# Patient Record
Sex: MALE | Race: BLACK OR AFRICAN AMERICAN | Employment: FULL TIME | URBAN - METROPOLITAN AREA
[De-identification: names, ages, dates, MRNs, and addresses within clinical notes are randomized per-mention and may not be internally consistent; named-entity substitution may affect disease eponyms.]

---

## 2019-05-08 ENCOUNTER — HOSPITAL ENCOUNTER (OUTPATIENT)
Dept: PHYSICAL THERAPY | Age: 19
Discharge: HOME | End: 2019-05-08
Payer: COMMERCIAL

## 2019-05-08 PROCEDURE — 97161 PT EVAL LOW COMPLEX 20 MIN: CPT

## 2019-05-08 PROCEDURE — 97110 THERAPEUTIC EXERCISES: CPT
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
## 2019-05-08 NOTE — PROGRESS NOTES
PHYSICAL THERAPY - DAILY TREATMENT NOTE Patient Name: Wero Dothan        Date: 2019 : 2000   yes Patient  Verified Visit #:      12  Insurance: Payor: /   
 
In time: 600 Out time: 0 Total Treatment Time: 40 Medicare/[facility] Time Tracking (below) Total Timed Codes (min):  na 1:1 Treatment Time:  na  
TREATMENT AREA =  Neck pain [M54.2] SUBJECTIVE Pain Level (on 0 to 10 scale):   10 Medication Changes/New allergies or changes in medical history, any new surgeries or procedures?    no  If yes, update Summary List  
Subjective Functional Status/Changes:  []  No changes reported SEE POC OBJECTIVE 30 min Evaluation 10 min Therapeutic Exercise:  [x]  See flow sheet Rationale:      increase ROM and increase strength to improve the patients ability to perform functional ADL's Billed With/As: 
 [] TE 
 [] TA 
 [] Neuro 
 [] Self Care Patient Education: [x] Review HEP [] Progressed/Changed HEP based on:  
[] positioning   [] body mechanics   [] transfers   [] heat/ice application   
[] other:   
Other Objective/Functional Measures: SEE POC Post Treatment Pain Level (on 0 to 10) scale:   4  / 10 ASSESSMENT Assessment/Changes in Function:  
 
Evaluation Code Complexity: History LOW Complexity : Zero comorbidities / personal factors that will impact the outcome / POC; Examination HIGH Complexity : 4+ Standardized tests and measures addressing body structure, function, activity limitation and / or participation in recreation ; Presentation MEDIUM Complexity : Evolving with changing characteristics ; Decision Making MEDIUM Complexity : FOTO score of 26-74; Complexity LOW Justification for Eval Code Complexity: 
Patient History : None Listed Examination SEE ABOVE EXAM 
Clinical Presentation: evolving pain Clinical Decision Making : MACM 32 
 
  
[]  See Progress Note/Recertification Patient will continue to benefit from skilled PT services to modify and progress therapeutic interventions, address functional mobility deficits, address ROM deficits, address strength deficits, analyze and address soft tissue restrictions, analyze and cue movement patterns, analyze and modify body mechanics/ergonomics and assess and modify postural abnormalities to attain remaining goals. Progress toward goals / Updated goals: SEE POC PLAN 
[]  Upgrade activities as tolerated yes Continue plan of care  
[]  Discharge due to :   
[x]  Other: Pt will be seen 2 times per week for 12 sessions. Therapist: Luis Daniel Juan PT, DPT Date: 5/8/2019 Time: 3:09 PM  
 
Future Appointments Date Time Provider Dixon Cummings 5/8/2019  6:00 PM Yg Patel, PT [facility]

## 2019-05-13 ENCOUNTER — HOSPITAL ENCOUNTER (OUTPATIENT)
Dept: PHYSICAL THERAPY | Age: 19
Discharge: HOME | End: 2019-05-13
Payer: COMMERCIAL

## 2019-05-13 PROCEDURE — 97110 THERAPEUTIC EXERCISES: CPT

## 2019-05-13 NOTE — PROGRESS NOTES
PHYSICAL THERAPY - DAILY TREATMENT NOTE Patient Name: Chasidy Salas        Date: 2019 : 2000   yes Patient  Verified Visit #:   2   of   12  Insurance: Payor: Anahi Chappell / Plan: [employer]  CAPITATED PT / Product Type: Commerical / In time: 5:48 PM Out time: 6:29 PM  
Total Treatment Time: 39 Medicare/[facility] Time Tracking (below) Total Timed Codes (min):  N/A 1:1 Treatment Time:  N/A  
TREATMENT AREA =  Neck pain [M54.2] SUBJECTIVE Pain Level (on 0 to 10 scale):  3-4   10 Medication Changes/New allergies or changes in medical history, any new surgeries or procedures?    no  If yes, update Summary List  
Subjective Functional Status/Changes:  []  No changes reported Pt reports looking down or turning his head around increases his pain. OBJECTIVE 41 min Therapeutic Exercise:  [x]  See flow sheet Rationale:      increase ROM and increase strength to improve the patients ability to to complete work duties. held min Manual Therapy: STM/DTM to c/s paraspinals, UT/lev scap, rhomboids Rationale:      decrease pain, increase ROM and increase tissue extensibility to improve patient's ability to complete ADLs. Billed With/As: 
 [] TE 
 [] TA 
 [] Neuro 
 [] Self Care Patient Education: [x] Review HEP [] Progressed/Changed HEP based on:  
[] positioning   [] body mechanics   [] transfers   [] heat/ice application   
[] other:   
Other Objective/Functional Measures: 
 
Presented to PT session 18 minutes past appt. Pt agreed to modified PT session. Advised pt to come on time for full benefits of physical therapy. Pt responded with understanding. Initiated therex per POC (see flowsheet); req'd cues 100% of therex for proper technique and gentle ms activation. Post Treatment Pain Level (on 0 to 10) scale:   3-4   10 ASSESSMENT Assessment/Changes in Function: Able to maintain pain level throughout therex without exacerbation of symptoms. Overall fair tolerance with initiation of therex. []  See Progress Note/Recertification Patient will continue to benefit from skilled PT services to modify and progress therapeutic interventions, address functional mobility deficits, address ROM deficits, address strength deficits, analyze and address soft tissue restrictions, analyze and cue movement patterns, analyze and modify body mechanics/ergonomics and assess and modify postural abnormalities to attain remaining goals. Progress toward goals / Updated goals: No significant progress towards PT goals today due to 1st F/U appt PLAN 
[]  Upgrade activities as tolerated yes Continue plan of care  
[]  Discharge due to :   
[]  Other:   
 
Therapist: SUPRIYA Yoder Date: 5/13/2019 Time: 6:34 PM  
 
Future Appointments Date Time Provider Dixon Cummings 5/13/2019  5:30 PM Jose M Ba [facility]  
5/15/2019  5:30 PM Jose M Ba [facility]  
5/20/2019  5:30 PM Jose M Ba [facility]  
5/22/2019  5:30 PM Jose M Ba [facility]  
5/29/2019  5:30 PM Jose M Ba [facility]  
6/3/2019  5:30 PM Socorro [facility]  
6/5/2019  5:30 PM Terrence Hampton [facility]  
6/11/2019  5:30 PM Henriette House [facility]

## 2019-05-15 ENCOUNTER — HOSPITAL ENCOUNTER (OUTPATIENT)
Dept: PHYSICAL THERAPY | Age: 19
End: 2019-05-15
Payer: COMMERCIAL

## 2019-05-20 ENCOUNTER — HOSPITAL ENCOUNTER (OUTPATIENT)
Dept: PHYSICAL THERAPY | Age: 19
Discharge: HOME | End: 2019-05-20
Payer: COMMERCIAL

## 2019-05-20 PROCEDURE — 97110 THERAPEUTIC EXERCISES: CPT

## 2019-05-20 PROCEDURE — 97140 MANUAL THERAPY 1/> REGIONS: CPT

## 2019-05-20 NOTE — PROGRESS NOTES
PHYSICAL THERAPY - DAILY TREATMENT NOTE Patient Name: Morris Dolan        Date: 2019 : 2000   yes Patient  Verified Visit #:   3   of   12  Insurance: Payor: Nava Comment / Plan: [employer]  CAPITATED PT / Product Type: Commerical / In time: 5:30 PM Out time: 6:17 PM  
Total Treatment Time: 52 Medicare/[facility] Time Tracking (below) Total Timed Codes (min):  N/A 1:1 Treatment Time:  N/A  
TREATMENT AREA =  Neck pain [M54.2] SUBJECTIVE Pain Level (on 0 to 10 scale):  4 / 10 Medication Changes/New allergies or changes in medical history, any new surgeries or procedures?    no  If yes, update Summary List  
Subjective Functional Status/Changes:  []  No changes reported Pt reports having everywhere in his back, but has more pain in his midback area due to work. OBJECTIVE 32 min Therapeutic Exercise:  [x]  See flow sheet Rationale:      increase ROM and increase strength to improve the patients ability to to complete work duties. 15 min Manual Therapy: STM/DTM to c/s paraspinals, UT/lev scap in supine and rhomboids in prone; c/s PROM in all directions Rationale:      decrease pain, increase ROM and increase tissue extensibility to improve patient's ability to complete ADLs. Billed With/As: 
 [] TE 
 [] TA 
 [] Neuro 
 [] Self Care Patient Education: [x] Review HEP [] Progressed/Changed HEP based on:  
[] positioning   [] body mechanics   [] transfers   [] heat/ice application   
[] other:   
Other Objective/Functional Measures: 
 
Minimal to moderate hypertonicity noted in ms belly of UT. Decrease c/s R rot and LSB during MT. Added S/L T/S rot with FR to improve spinal mobility. Increase repetitions with TB rows to improve scapular stability for improved posture. Post Treatment Pain Level (on 0 to 10) scale:  3  / 10 ASSESSMENT Assessment/Changes in Function:  
 
Continues to demo poor seated slouched posture throughout tx session. Req'd cues for upright posture when sitting on table, able to maintain fair posture when sitting in chair with back. Noted minimal decrease in subjective pain following tx session indicating fair response to current PT interventions. Will continue to progress therex per pt's tolerance and POC . []  See Progress Note/Recertification Patient will continue to benefit from skilled PT services to modify and progress therapeutic interventions, address functional mobility deficits, address ROM deficits, address strength deficits, analyze and address soft tissue restrictions, analyze and cue movement patterns, analyze and modify body mechanics/ergonomics and assess and modify postural abnormalities to attain remaining goals. Progress toward goals / Updated goals: 
Steady progress towards pain reduction goal (STG #2) PLAN 
[]  Upgrade activities as tolerated yes Continue plan of care  
[]  Discharge due to :   
[]  Other:   
 
Therapist: SUPRIYA [place] Date: 5/20/2019 Time: 6:57 PM  
 
Future Appointments Date Time Provider Dixon Cummings 5/20/2019  5:30 PM Therese Baspan [facility]  
5/22/2019  5:30 PM Therese Ba [facility]  
5/29/2019  5:30 PM Therese Baspan [facility]  
6/3/2019  5:30 PM Flavio German [facility]  
6/5/2019  5:30 PM Baljinder Conte [facility]  
6/11/2019  5:30 PM Carl Noel [facility]

## 2019-05-22 ENCOUNTER — HOSPITAL ENCOUNTER (OUTPATIENT)
Dept: PHYSICAL THERAPY | Age: 19
Discharge: HOME | End: 2019-05-22
Payer: COMMERCIAL

## 2019-05-22 PROCEDURE — 97110 THERAPEUTIC EXERCISES: CPT

## 2019-05-22 PROCEDURE — 97140 MANUAL THERAPY 1/> REGIONS: CPT

## 2019-05-22 NOTE — PROGRESS NOTES
PHYSICAL THERAPY - DAILY TREATMENT NOTE    Patient Name: Ziyad Zeng        Date: 2019  : 2000   yes Patient  Verified  Visit #:     Insurance: Payor: Manisha Dick / [facility] / Product Type: Commerical /      In time: 5:33 PM Out time: 6:26 PM   Total Treatment Time: 53     Medicare/[facility] Time Tracking (below)   Total Timed Codes (min):  N/A 1:1 Treatment Time:  N/A     TREATMENT AREA =  Neck pain [M54.2]    SUBJECTIVE  Pain Level (on 0 to 10 scale): 4 / 10   Medication Changes/New allergies or changes in medical history, any new surgeries or procedures?    no  If yes, update Summary List   Subjective Functional Status/Changes:  []  No changes reported     Pt reports having more R sided neck pain today. OBJECTIVE    40 min Therapeutic Exercise:  [x]  See flow sheet   Rationale:      increase ROM and increase strength to improve the patients ability to to complete work duties. 13 min Manual Therapy: STM/DTM to (B) c/s paraspinals, UT/lev scap in supine   Rationale:      decrease pain, increase ROM and increase tissue extensibility to improve patient's ability to complete ADLs. Billed With/As:   [] TE   [] TA   [] Neuro   [] Self Care Patient Education: [x] Review HEP    [] Progressed/Changed HEP based on:   [] positioning   [] body mechanics   [] transfers   [] heat/ice application    [] other:      Other Objective/Functional Measures: Moderate hypertonicity noted in R>L c/s paraspinals and ms belly UT. TTP R>L mms today     Post Treatment Pain Level (on 0 to 10) scale:  3  / 10     ASSESSMENT  Assessment/Changes in Function:     Able to perform all therex without signs of distress or red flags. Continues to require cues for upright posture. Educated pt on importance of improved posture for decrease symptoms. Pt responded with understanding.      []  See Progress Note/Recertification   Patient will continue to benefit from skilled PT services to modify and progress therapeutic interventions, address functional mobility deficits, address ROM deficits, address strength deficits, analyze and address soft tissue restrictions, analyze and cue movement patterns, analyze and modify body mechanics/ergonomics and assess and modify postural abnormalities to attain remaining goals.    Progress toward goals / Updated goals:    Steady progress towards pain reduction goal (STG #2)     PLAN  []  Upgrade activities as tolerated yes Continue plan of care   []  Discharge due to :    []  Other:      Therapist: SUPRIYA Patterson    Date: 5/22/2019 Time: 7:15 PM     Future Appointments   Date Time Provider Dixon Cummings   5/22/2019  5:30 PM [facility]   5/29/2019  5:30 PM Flavio Ba [facility]   6/3/2019  5:30 PM Susanne Zafar [facility]   6/5/2019  5:30 PM Braulio Sims [facility]   6/11/2019  5:30 PM Grey Duty

## 2019-05-29 ENCOUNTER — HOSPITAL ENCOUNTER (OUTPATIENT)
Dept: PHYSICAL THERAPY | Age: 19
Discharge: HOME | End: 2019-05-29
Payer: COMMERCIAL

## 2019-05-29 PROCEDURE — 97110 THERAPEUTIC EXERCISES: CPT

## 2019-05-29 PROCEDURE — 97140 MANUAL THERAPY 1/> REGIONS: CPT

## 2019-05-29 NOTE — PROGRESS NOTES
PHYSICAL THERAPY - DAILY TREATMENT NOTE    Patient Name: Francheska Smith        Date: 2019  : 2000   yes Patient  Verified  Visit #:     Insurance: Payor: Beni Lose / Plan: [address] PT / Product Type: Commerical /      In time: 5:32 PM Out time: 6:23 PM   Total Treatment Time: 51     Medicare/[facility] Time Tracking (below)   Total Timed Codes (min):  N/A 1:1 Treatment Time:  N/A     TREATMENT AREA =  Neck pain [M54.2]    SUBJECTIVE  Pain Level (on 0 to 10 scale): 2-3  10   Medication Changes/New allergies or changes in medical history, any new surgeries or procedures?    no  If yes, update Summary List   Subjective Functional Status/Changes:  []  No changes reported     Pt currently reports feeling better today, but has no idea why. States that he quit his job last Friday and could contribute to his reduce pain due to no heavy lifting. 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 Also reports he has been noncompliant with HEP and attempting to improve posture at home. OBJECTIVE    41 min Therapeutic Exercise:  [x]  See flow sheet   Rationale:      increase ROM and increase strength to improve the patients ability to to complete work duties. 10 min Manual Therapy: STM/DTM to (B) c/s paraspinals, UT/lev scap in supine   Rationale:      decrease pain, increase ROM and increase tissue extensibility to improve patient's ability to complete ADLs.      Billed With/As:   [x] TE   [] TA   [] Neuro   [] Self Care Patient Education: [x] Review HEP    [] Progressed/Changed HEP based on:   [] positioning   [] body mechanics   [] transfers   [] heat/ice application    [] other:      Other Objective/Functional Measures:    Noted minimal to no muscle restrictions during manual therapy today  Added TB horizontal abd and increase repetitions/resistance with TB extension to increase scapular strength for improved posture     Post Treatment Pain Level (on 0 to 10) scale:  1  / 10 ASSESSMENT  Assessment/Changes in Function:     Educated pt on importance of compliance with HEP to maintain gains made from PT and manage symptoms. Pt responded with understanding. Able to reduce subjective pain and noted improved c/s extension following tx session indicating good response and tolerance with current PT interventions. []  See Progress Note/Recertification   Patient will continue to benefit from skilled PT services to modify and progress therapeutic interventions, address functional mobility deficits, address ROM deficits, address strength deficits, analyze and address soft tissue restrictions, analyze and cue movement patterns, analyze and modify body mechanics/ergonomics and assess and modify postural abnormalities to attain remaining goals. Progress toward goals / Updated goals: · Short Term Goals: To be accomplished in  6  treatments:  1. Pt to demonstrate independence with HEP to improve c/s AROM and posture for ADLs. Not met 05/29; noncompliant   2. Pt to report max pain <3/10 in order to improve sleeping tolerance.  Progressing 05/29; reports max pain 3/10 today          PLAN  [x]  Upgrade activities as tolerated yes Continue plan of care   []  Discharge due to :    []  Other:      Therapist: SUPRIYA Oswald    Date: 5/29/2019 Time: 7:31 PM     Future Appointments   Date Time Provider Dixon Cummings   5/29/2019  5:30 PM Richard Miller [facility]   6/3/2019  5:30 PM Savi Ba [facility]   6/5/2019  5:30 PM Mindy Starr [facility]   6/11/2019  5:30 PM Josepha Homans

## 2019-06-03 ENCOUNTER — HOSPITAL ENCOUNTER (OUTPATIENT)
Dept: PHYSICAL THERAPY | Age: 19
Discharge: HOME | End: 2019-06-03
Payer: COMMERCIAL

## 2019-06-03 PROCEDURE — 97110 THERAPEUTIC EXERCISES: CPT

## 2019-06-03 NOTE — PROGRESS NOTES
PHYSICAL THERAPY - DAILY TREATMENT NOTE    Patient Name: Brooklyn Eric        Date: 6/3/2019  : 2000   yes Patient  Verified  Visit #:     Insurance: Payor: Andreia Betts / Plan: [address] [place] [address] [facility] / Product Type: Commerical /       In time: 5:30 PM Out time: 6:15 PM   Total Treatment Time: 45     Medicare/[facility] Time Tracking (below)   Total Timed Codes (min):  N/A 1:1 Treatment Time:  N/A     TREATMENT AREA =  Neck pain [M54.2]    SUBJECTIVE  Pain Level (on 0 to 10 scale): 0 / 10   Medication Changes/New allergies or changes in medical history, any new surgeries or procedures?    no  If yes, update Summary List   Subjective Functional Status/Changes:  []  No changes reported     Pt currently reports no pain in the neck. States that he has been going back to the gym and playing basketball, but \"does not go as hard as before\". OBJECTIVE    45 min Therapeutic Exercise:  [x]  See flow sheet   Rationale:      increase ROM and increase strength to improve the patients ability to to complete work duties. held min Manual Therapy: STM/DTM to (B) c/s paraspinals, UT/lev scap in supine   Rationale:      decrease pain, increase ROM and increase tissue extensibility to improve patient's ability to complete ADLs. Billed With/As:   [x] TE   [] TA   [] Neuro   [] Self Care Patient Education: [x] Review HEP    [x] Progressed/Changed HEP based on:   [] positioning   [x] body mechanics   [] transfers   [] heat/ice application    [x] other: added scapular strengthening exercises to maintain good posture     Other Objective/Functional Measures:    Held on performing manual therapy today due to subjective statement - focused on active interventions to maintain gains made from PT.  Demonstrates WNL c/s AROM in all directions    FOTO: 68   GROC:  +7     Post Treatment Pain Level (on 0 to 10) scale:  0 / 10     ASSESSMENT  Assessment/Changes in Function:     Able to maintain 0/10 pain level throughout pre and post tx session. []  See Progress Note/Recertification   Patient will continue to benefit from skilled PT services to modify and progress therapeutic interventions, address functional mobility deficits, address ROM deficits, address strength deficits, analyze and address soft tissue restrictions, analyze and cue movement patterns, analyze and modify body mechanics/ergonomics and assess and modify postural abnormalities to attain remaining goals. Progress toward goals / Updated goals:    · Long Term Goals: To be accomplished in  12  treatments:  1. Pt to report +5 or > on GROC to improve functional mobility for ADLs. MET 06/03; +7  2. Improve FOTO outcome score to 70/100 in order to indicate a significant improvement in ADL function. Progressing 06/03; 68/100  3. Pt will decrease C/S AROM restriction to WNL in all directions in order to improve function with ADL's met 06/03; able to demonstrate WNL c/s AROM without pain  4. Patient will be independent with long term HEP in order to prepare for DC to home.          PLAN  [x]  Upgrade activities as tolerated yes Continue plan of care   []  Discharge due to :    []  Other:      Therapist: SUPRIYA Angeles    Date: 6/3/2019 Time: 7:05 PM     Future Appointments   Date Time Provider Dixon Cummings   6/3/2019  5:30 PM Flavio [facility]   6/5/2019  5:30 PM Baljinder ARANDA [facility]   6/11/2019  5:30 PM Amelie Baca

## 2019-06-05 ENCOUNTER — HOSPITAL ENCOUNTER (OUTPATIENT)
Dept: PHYSICAL THERAPY | Age: 19
Discharge: HOME | End: 2019-06-05
Payer: COMMERCIAL

## 2019-06-05 PROCEDURE — 97110 THERAPEUTIC EXERCISES: CPT

## 2019-06-05 NOTE — PROGRESS NOTES
PHYSICAL THERAPY - DAILY TREATMENT NOTE    Patient Name: Roman Reynolds        Date: 2019  : 2000   yes Patient  Verified  Visit #:     Insurance: Payor: Shaka Santana / Plan: [address] PT / Product Type: Commerical /      In time: 530 Out time: 605   Total Treatment Time: 35     Medicare/[employer] Time Tracking (below)   Total Timed Codes (min):  35 1:1 Treatment Time:       TREATMENT AREA =  Neck pain [M54.2]    SUBJECTIVE  Pain Level (on 0 to 10 scale):  0  / 10   Medication Changes/New allergies or changes in medical history, any new surgeries or procedures?    no  If yes, update Summary List   Subjective Functional Status/Changes:  []  No changes reported     Pt reporting no pain and no specific causes of pain at the moment. Has not been back to work yet, so not sure how that will go, and he is in the middle of a job change. OBJECTIVE  35 min Therapeutic Exercise:  [x]  See flow sheet   Rationale:      increase ROM, increase strength and improve coordination to improve the patients ability to perform pain free ADLs. Billed With/As:   [x] TE   [] TA   [] Neuro   [] Self Care Patient Education: [x] Review HEP    [] Progressed/Changed HEP based on:   [] positioning   [] body mechanics   [] transfers   [] heat/ice application    [] other:      Other Objective/Functional Measures: Therex per flow sheet. Increased resistance with multiple exercises to improve strength and stability for ADLs. Post Treatment Pain Level (on 0 to 10) scale:   0   / 10     ASSESSMENT  Assessment/Changes in Function:     Demonstrates compliance with HEP. Pt advised to return to full activities to determine if capable of all ADL's without pain. Plan to discontinue PT following last remaining visit.       []  See Progress Note/Recertification   Patient will continue to benefit from skilled PT services to modify and progress therapeutic interventions, address functional mobility deficits, address ROM deficits, address strength deficits, analyze and address soft tissue restrictions, analyze and cue movement patterns, analyze and modify body mechanics/ergonomics and assess and modify postural abnormalities to attain remaining goals. Progress toward goals / Updated goals:    Good progress toward LTG #4.        PLAN  [x]  Upgrade activities as tolerated yes Continue plan of care   []  Discharge due to :    []  Other:      Therapist: Nazia Marshall PTA    Date: 6/5/2019 Time: 5:37 PM     Future Appointments   Date Time Provider Dixon Cummings   6/11/2019  5:30 PM David Schneider

## 2019-06-11 ENCOUNTER — HOSPITAL ENCOUNTER (OUTPATIENT)
Dept: PHYSICAL THERAPY | Age: 19
Discharge: HOME | End: 2019-06-11
Payer: COMMERCIAL

## 2019-06-11 PROCEDURE — 97110 THERAPEUTIC EXERCISES: CPT
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
## 2019-06-11 NOTE — PROGRESS NOTES
PHYSICAL THERAPY - DAILY TREATMENT NOTE    Patient Name: Devin Shepherd        Date: 2019  : 2000   yes Patient  Verified  Visit #:     Insurance: Payor: Radha Courtney / Plan:  [address] [facility] / Product Type: Commerical /       In time: 5;30 PM Out time: 6:22 PM   Total Treatment Time: 52     Medicare/[facility] Time Tracking (below)   Total Timed Codes (min):  N/A 1:1 Treatment Time:  N/A     TREATMENT AREA =  Neck pain [M54.2]    SUBJECTIVE  Pain Level (on 0 to 10 scale): 0 / 10   Medication Changes/New allergies or changes in medical history, any new surgeries or procedures?    no  If yes, update Summary List   Subjective Functional Status/Changes:  []  No changes reported      SEE D/C       OBJECTIVE    52 min Therapeutic Exercise:  [x]  See flow sheet   Rationale:      increase ROM and increase strength to improve the patients ability to complete work duties. Billed With/As:   [x] TE   [] TA   [] Neuro   [] Self Care Patient Education: [x] Review HEP    [x] Progressed/Changed HEP based on:   [] positioning   [] body mechanics   [] transfers   [] heat/ice application    [] other:      Other Objective/Functional Measures:    SEE D/C     Post Treatment Pain Level (on 0 to 10) scale:  0 / 10     ASSESSMENT  Assessment/Changes in Function:      SEE D/C      []  See Progress Note/Recertification   Patient will continue to benefit from skilled PT services to modify and progress therapeutic interventions, address functional mobility deficits, address ROM deficits, address strength deficits, analyze and address soft tissue restrictions, analyze and cue movement patterns, analyze and modify body mechanics/ergonomics and assess and modify postural abnormalities to attain remaining goals.    Progress toward goals / Updated goals:    SEE D/C     PLAN  []  Upgrade activities as tolerated no Continue plan of care   [x]  Discharge due to : Completed PT program; progressing or met all LTGs   []  Other: Therapist: SUPRIYA Villanueva    Date: 6/11/2019 Time: 6:35 PM     Future Appointments   Date Time Provider Dixon Cummings   6/11/2019  5:30 PM Tisha Alanis